# Patient Record
Sex: FEMALE | Race: WHITE
[De-identification: names, ages, dates, MRNs, and addresses within clinical notes are randomized per-mention and may not be internally consistent; named-entity substitution may affect disease eponyms.]

---

## 2018-12-27 ENCOUNTER — HOSPITAL ENCOUNTER (OUTPATIENT)
Dept: HOSPITAL 53 - M LAB REF | Age: 38
End: 2018-12-27
Attending: PHYSICIAN ASSISTANT
Payer: COMMERCIAL

## 2018-12-27 DIAGNOSIS — N39.0: Primary | ICD-10-CM

## 2018-12-27 LAB
AMORPH SED URNS QL MICRO: (no result)
APPEARANCE UR: (no result)
BACTERIA UR QL AUTO: NEGATIVE
BILIRUB UR QL STRIP.AUTO: NEGATIVE
GLUCOSE UR QL STRIP.AUTO: NEGATIVE MG/DL
HGB UR QL STRIP.AUTO: NEGATIVE
KETONES UR QL STRIP.AUTO: NEGATIVE MG/DL
LEUKOCYTE ESTERASE UR QL STRIP.AUTO: NEGATIVE
NITRITE UR QL STRIP.AUTO: NEGATIVE
PH UR STRIP.AUTO: 7 UNITS (ref 5–9)
PROT UR QL STRIP.AUTO: NEGATIVE MG/DL
RBC # UR AUTO: 1 /HPF (ref 0–3)
SP GR UR STRIP.AUTO: 1.02 (ref 1–1.03)
SQUAMOUS #/AREA URNS AUTO: 2 /HPF (ref 0–6)
UROBILINOGEN UR QL STRIP.AUTO: 0.2 MG/DL (ref 0–2)
WBC #/AREA URNS AUTO: 0 /HPF (ref 0–3)

## 2019-01-15 ENCOUNTER — HOSPITAL ENCOUNTER (OUTPATIENT)
Dept: HOSPITAL 53 - M LAB REF | Age: 39
End: 2019-01-15
Attending: OBSTETRICS & GYNECOLOGY
Payer: COMMERCIAL

## 2019-01-15 DIAGNOSIS — Z12.4: Primary | ICD-10-CM

## 2019-01-16 LAB — HPV LOW VOL RFLX: (no result)

## 2020-01-08 ENCOUNTER — HOSPITAL ENCOUNTER (OUTPATIENT)
Dept: HOSPITAL 53 - M RAD | Age: 40
End: 2020-01-08
Attending: ADVANCED PRACTICE MIDWIFE
Payer: COMMERCIAL

## 2020-01-08 DIAGNOSIS — N60.02: Primary | ICD-10-CM

## 2020-01-08 PROCEDURE — 76642 ULTRASOUND BREAST LIMITED: CPT

## 2020-01-08 PROCEDURE — 77066 DX MAMMO INCL CAD BI: CPT

## 2020-01-08 NOTE — REP
DIGITAL DIAGNOSTIC BILATERAL MAMMOGRAPHY WITH CAD, 3-D TOMOGRAPHY, AND FOCUSED

LEFT BREAST SONOGRAPHY:

 

HISTORY:  Left breast cyst.  The patient reports upper outer quadrant pain on the

left with thickening and tenderness.

 

Comparison is made with prior mammography of the right breast from [facility] dated this June 4, 2008 and January 28, 2009.

 

MAMMOGRAPHIC FINDINGS:  Breast parenchyma is heterogeneously dense in a pattern

which inhibits the sensitivity of mammography.  On routine mammographic views,

there is a needle biopsy marker clip in the right breast medially at the site

where prior mammography showed a nodule.  By history, this was benign.  No

dominant density is seen in either breast mammographically.  3D tomography shows

no suspicious finding.

 

SONOGRAPHIC FINDINGS:  The upper outer quadrant of the left breast was scanned.

Heterogeneous fibroglandular background echotexture is seen.  There are three

hypoechoic nodules seen.  In the 1-o'clock position, there is a 0.7 x 0.3 x 0.5

cm hypoechoic oval shaped nodule located 3.6 cm from the nipple.  At 2-o'clock

position, there is a 1.4 x 0.7 x 1.5 cm hypoechoic nodule located 4.5 cm from the

nipple.  At 2-o'clock position, there is also a 1.6 x 0.6 x 1.3 cm nodule located

3.1 cm from the nipple.  Each of these is oval in shape with its long axis

parallel to the skin.  They have a somewhat lobulated borders with enhanced

through transmission.  They are consistent with fibroadenomas although

nonspecific.  No cyst is seen.  No architectural distortion is noted.

 

IMPRESSION:

 

BIRADS 3:  BI-RADS/ACR category 3 mammogram.  Probably Benign Findings.

 

BIRADS category 3 probably benign breast imaging.  There are three solid

hypoechoic nodules in the upper outer quadrant of the left breast compatible with

fibroadenomas.  Followup sonography is recommended in 6 months.

 

Mammography is unremarkable and can be repeated in 1 year bilaterally.

 

This mammogram was interpreted with the aid of an FDA-approved computer-aided

detection system.

 

The patient states she had a clinical breast exam in January 2020.

 

The patient letter being requested is M3 dense.

 

This patient's estimated [facility] lifetime risk assessment for the breast

cancer is 12.6 %.

 

 

Electronically Signed by

Raphael Lipscomb MD 01/08/2020 01:31 P

## 2020-01-24 ENCOUNTER — HOSPITAL ENCOUNTER (OUTPATIENT)
Dept: HOSPITAL 53 - M IRPRO | Age: 40
End: 2020-01-24
Attending: SURGERY
Payer: COMMERCIAL

## 2020-01-24 VITALS — SYSTOLIC BLOOD PRESSURE: 114 MMHG | DIASTOLIC BLOOD PRESSURE: 68 MMHG

## 2020-01-24 DIAGNOSIS — N60.22: Primary | ICD-10-CM

## 2020-01-24 NOTE — REP
Left breast sonography:

 

History:  Left breast biopsy.

 

Findings:  Sonographic guidance is provided to Dr. Ng who performed

ultrasound-guided needle biopsy procedure.

 

 

Electronically Signed by

Raphael Lipscomb MD 01/24/2020 06:37 P

## 2020-01-24 NOTE — ROOPDOC
[facility] Report Of Operation


Report of Operation


DATE OF PROCEDURE: 1/24/20





PREPROCEDURE DIAGNOSES: left breast masses





POSTPROCEDURE DIAGNOSES: left breast masses





PROCEDURE: Ultrasound guided biopsy of one of three left breast masses and clip 

placement





SURGEON: Anatoliy Ng





ASSISTANT: 





ANESTHESIA:local, 6 cc





ESTIMATED BLOOD LOSS: Approximately 1 mL. 





COMPLICATIONS: none





REMARKS: clip in good position on post bx mammogram 





DESCRIPTION OF PROCEDURE: 





Lidocaine 1% 


Sodium Bicarbonate 8.4% 


Hydromark clip LOT [id number] Expiration 11/11/2022 REF  [id number]


Bx device: BARD [id number] x10 cm LOT [id number] Expiration 10/28/2022 REF [id number]







Informed consent was obtained in the preop area. The most common risk and 

possible complications including bleeding, hematoma, bruising, infection, injury

to surrounding structures were explained to the patient and she expressed 

understanding. 





Patient was taken to the procedure room and placed on the bed in the supine 

position with the left upper extremity placed above the head. Appropriate time 

out was done stating patients name, date of birth, and the procedure to be 

performed. The left breast was prepped and draped in the usual fashion. The 

ultrasound was used to confirm the location of the lesion in the left breast at 

2:00. There were two hypoechoic masses present at that location and one smaller 

hypoechoic mass at 1:00.  Decision before procedure was started to biopsy one of

the masses since all of them look similar and likely represent fibroadenomas.





Plain Lidocaine 1% and 8.4% sodium bicarbonate 10:1 mix was used to numb the 

skin, the biopsy site and tissues along the anticipated biopsy tract. Small skin

incision was made with blade number 11.  BARD Marquee 14G cannula with 

introducer ([id number]) was inserted through the incision and advanced under the 

ultrasound guidance to position immediately adjacent to the lesion. Next, the 

introducer was removed and BARD Marquee 14G biopsy device was places in the 

cannula. Pre-biopsy imaging, and post-biopsy imaging were captured. Five good 

core biopsies were taken at various levels of the lesion. 





Next, the biopsy device was withdrawn and a clip introducer was inserted into 

the biopsy site via the cannula. The Hydromark clip was deployed under direct 

vision. Post-clip placement image was captured. 





Manual pressure over the biopsy cavity and tract was held after the clip 

introducer was withdrawn. No bleeding was noted upon removal of the pressure. 





Post-biopsy mammogram of the left breast was obtained and showed clip in 

expected position. Postprocedural dressing was placed.


Patient tolerated procedure well and was taken to the recovery unit in stable 

condition.  Discharge instructions were discussed with the patient and she 

expressed understanding.





ANATOLIY Langston DO     Jan 24, 2020 11:34

## 2020-01-24 NOTE — REP
Digital diagnostic unilateral left breast mammography with CAD:  Two views.

 

History:  Marker clip placement views.  The patient is status post

ultrasound-guided needle biopsy procedure.

 

Findings:  Craniocaudad and mediolateral views of the left breast demonstrate the

HydroMARK clip in the upper outer quadrant posteriorly.  There is no evidence of

hematoma.

 

Impression:

 

Marker clip from ultrasound-guided biopsy noted in the upper outer quadrant left

breast.

 

 

Electronically Signed by

Raphael Lipscomb MD 01/24/2020 06:34 P

## 2021-01-27 ENCOUNTER — HOSPITAL ENCOUNTER (OUTPATIENT)
Dept: HOSPITAL 53 - M LAB REF | Age: 41
End: 2021-01-27
Attending: PHYSICIAN ASSISTANT
Payer: COMMERCIAL

## 2021-01-27 DIAGNOSIS — N39.0: Primary | ICD-10-CM

## 2021-01-27 LAB
APPEARANCE UR: CLEAR
BACTERIA UR QL AUTO: NEGATIVE
BILIRUB UR QL STRIP.AUTO: NEGATIVE
GLUCOSE UR QL STRIP.AUTO: NEGATIVE MG/DL
HGB UR QL STRIP.AUTO: NEGATIVE
KETONES UR QL STRIP.AUTO: NEGATIVE MG/DL
LEUKOCYTE ESTERASE UR QL STRIP.AUTO: NEGATIVE
NITRITE UR QL STRIP.AUTO: NEGATIVE
PH UR STRIP.AUTO: 8 UNITS (ref 5–9)
PROT UR QL STRIP.AUTO: NEGATIVE MG/DL
RBC # UR AUTO: 0 /HPF (ref 0–3)
SP GR UR STRIP.AUTO: 1.02 (ref 1–1.03)
SQUAMOUS #/AREA URNS AUTO: 0 /HPF (ref 0–6)
UROBILINOGEN UR QL STRIP.AUTO: 0.2 MG/DL (ref 0–2)
WBC #/AREA URNS AUTO: 1 /HPF (ref 0–3)

## 2021-02-05 ENCOUNTER — HOSPITAL ENCOUNTER (OUTPATIENT)
Dept: HOSPITAL 53 - M LAB REF | Age: 41
End: 2021-02-05
Attending: PHYSICIAN ASSISTANT
Payer: COMMERCIAL

## 2021-02-05 ENCOUNTER — HOSPITAL ENCOUNTER (OUTPATIENT)
Dept: HOSPITAL 53 - M RAD | Age: 41
End: 2021-02-05
Attending: PHYSICIAN ASSISTANT
Payer: COMMERCIAL

## 2021-02-05 DIAGNOSIS — Z97.5: ICD-10-CM

## 2021-02-05 DIAGNOSIS — N39.0: Primary | ICD-10-CM

## 2021-02-05 DIAGNOSIS — Z87.442: Primary | ICD-10-CM

## 2021-02-05 LAB
APPEARANCE UR: CLEAR
BACTERIA UR QL AUTO: (no result)
BILIRUB UR QL STRIP.AUTO: NEGATIVE
GLUCOSE UR QL STRIP.AUTO: NEGATIVE MG/DL
HGB UR QL STRIP.AUTO: (no result)
KETONES UR QL STRIP.AUTO: (no result) MG/DL
LEUKOCYTE ESTERASE UR QL STRIP.AUTO: NEGATIVE
NITRITE UR QL STRIP.AUTO: POSITIVE
PH UR STRIP.AUTO: 6 UNITS (ref 5–9)
PROT UR QL STRIP.AUTO: NEGATIVE MG/DL
RBC # UR AUTO: 9 /HPF (ref 0–3)
SP GR UR STRIP.AUTO: 1.02 (ref 1–1.03)
SQUAMOUS #/AREA URNS AUTO: 0 /HPF (ref 0–6)
UROBILINOGEN UR QL STRIP.AUTO: 4 MG/DL (ref 0–2)
WBC #/AREA URNS AUTO: 1 /HPF (ref 0–3)

## 2021-02-05 NOTE — REP
INDICATION:

KIDNEY STONES.



COMPARISON:

None.



TECHNIQUE:

Two AP views abdomen and pelvis.



FINDINGS:

There is mild diffuse fecal material throughout the colon.  No dilated bowel loops are

seen.  No abnormal calcifications are seen.  No definite renal stones are seen.  IUD

is seen in the pelvis centrally.  The visualized osseous structures are unremarkable.



IMPRESSION:

Unremarkable KUB.  No radiographic evidence of renal stones.





<Electronically signed by Desmond Salazar > 02/05/21 2006

## 2021-02-16 ENCOUNTER — HOSPITAL ENCOUNTER (EMERGENCY)
Dept: HOSPITAL 53 - M ED | Age: 41
Discharge: HOME | End: 2021-02-16
Payer: COMMERCIAL

## 2021-02-16 VITALS — BODY MASS INDEX: 24.43 KG/M2 | WEIGHT: 155.65 LBS | HEIGHT: 67 IN

## 2021-02-16 VITALS — SYSTOLIC BLOOD PRESSURE: 120 MMHG | DIASTOLIC BLOOD PRESSURE: 70 MMHG

## 2021-02-16 DIAGNOSIS — E80.6: ICD-10-CM

## 2021-02-16 DIAGNOSIS — G43.909: ICD-10-CM

## 2021-02-16 DIAGNOSIS — R39.15: ICD-10-CM

## 2021-02-16 DIAGNOSIS — Z88.5: ICD-10-CM

## 2021-02-16 DIAGNOSIS — N20.0: Primary | ICD-10-CM

## 2021-02-16 DIAGNOSIS — Z97.5: ICD-10-CM

## 2021-02-16 DIAGNOSIS — R10.2: ICD-10-CM

## 2021-02-16 DIAGNOSIS — Z87.59: ICD-10-CM

## 2021-02-16 LAB
ALBUMIN SERPL BCG-MCNC: 3.9 GM/DL (ref 3.2–5.2)
ALT SERPL W P-5'-P-CCNC: 18 U/L (ref 12–78)
BASOPHILS # BLD AUTO: 0 10^3/UL (ref 0–0.2)
BASOPHILS NFR BLD AUTO: 0.8 % (ref 0–1)
BILIRUB CONJ SERPL-MCNC: 0.3 MG/DL (ref 0–0.2)
BILIRUB SERPL-MCNC: 1.7 MG/DL (ref 0.2–1)
CHLAMYDIA DNA AMPLIFICATION: NEGATIVE
EOSINOPHIL # BLD AUTO: 0 10^3/UL (ref 0–0.5)
EOSINOPHIL NFR BLD AUTO: 0.8 % (ref 0–3)
HCT VFR BLD AUTO: 40.2 % (ref 36–47)
HGB BLD-MCNC: 13.2 G/DL (ref 12–15.5)
LIPASE SERPL-CCNC: 103 U/L (ref 73–393)
LYMPHOCYTES # BLD AUTO: 1.6 10^3/UL (ref 1.5–5)
LYMPHOCYTES NFR BLD AUTO: 30.8 % (ref 24–44)
MCH RBC QN AUTO: 30.5 PG (ref 27–33)
MCHC RBC AUTO-ENTMCNC: 32.8 G/DL (ref 32–36.5)
MCV RBC AUTO: 92.8 FL (ref 80–96)
MONOCYTES # BLD AUTO: 0.4 10^3/UL (ref 0–0.8)
MONOCYTES NFR BLD AUTO: 7.6 % (ref 2–8)
N GONORRHOEA RRNA SPEC QL NAA+PROBE: NEGATIVE
NEUTROPHILS # BLD AUTO: 3.1 10^3/UL (ref 1.5–8.5)
NEUTROPHILS NFR BLD AUTO: 59.8 % (ref 36–66)
PLATELET # BLD AUTO: 184 10^3/UL (ref 150–450)
PROT SERPL-MCNC: 6.6 GM/DL (ref 6.4–8.2)
RBC # BLD AUTO: 4.33 10^6/UL (ref 4–5.4)
WBC # BLD AUTO: 5.1 10^3/UL (ref 4–10)

## 2021-02-16 PROCEDURE — 87661 TRICHOMONAS VAGINALIS AMPLIF: CPT

## 2021-02-16 PROCEDURE — 84702 CHORIONIC GONADOTROPIN TEST: CPT

## 2021-02-16 PROCEDURE — 81001 URINALYSIS AUTO W/SCOPE: CPT

## 2021-02-16 PROCEDURE — 99284 EMERGENCY DEPT VISIT MOD MDM: CPT

## 2021-02-16 PROCEDURE — 83690 ASSAY OF LIPASE: CPT

## 2021-02-16 PROCEDURE — 80047 BASIC METABLC PNL IONIZED CA: CPT

## 2021-02-16 PROCEDURE — 85025 COMPLETE CBC W/AUTO DIFF WBC: CPT

## 2021-02-16 PROCEDURE — 96360 HYDRATION IV INFUSION INIT: CPT

## 2021-02-16 PROCEDURE — 80076 HEPATIC FUNCTION PANEL: CPT

## 2021-02-16 PROCEDURE — 74177 CT ABD & PELVIS W/CONTRAST: CPT

## 2021-02-16 NOTE — CCD
Summarization Of Episode

                             Created on: 2021



CATINA MORIN

External Reference #: [id number]

: 1980

Sex: Female



Demographics





                          Address                   [address] [place] [address]

 

                          Home Phone                [phone]

 

                          Preferred Language        English

 

                          Marital Status            

 

                          Episcopal Affiliation     NONE

 

                          Race                      White

 

                          Ethnic Group              Not  or 





Author





                          Author                    [facility]

 

                          Organization              [facility]

 

                          Address                   Unknown

 

                          Phone                     Unavailable







Support





                Name            Relationship    Address         Phone

 

                MANNIE MORIN    Next Of Kin     Unknown         Unavailable

 

                    [employer] Next Of Kin         [address] [place]  [address]                    [phone]

 

                    BROOKS MORIN         Next Of Kin         [address] [place] [address]                       [phone]

 

                    [employer]  [place]  [address]                    [phone]

 

                    DONNA MORIN     Next Of Kin         [address] [place], [place] [address]                       [phone]

 

                    Brooks Morin         [place] [id number]                       [phone]







Care Team Providers





                    Care Team Member Name Role                Phone

 

                    Claudia,  Sierra FNP Unavailable         Unavailable

 

                    Claudia,  Sierra FNP Unavailable         Unavailable

 

                    Claudia,  Sierra FNP Unavailable         Unavailable

 

                    Claudia,  Sierra FNP Unavailable         Unavailable

 

                    Claudia,  Sierra FNP Unavailable         Unavailable

 

                    Claudia,  Sierra FNP Unavailable         Unavailable

 

                    Claudia,  Sierra FNP Unavailable         Unavailable

 

                    Claudia,  Sierra FNP Unavailable         Unavailable

 

                    Claudia,  Sierra FNP Unavailable         Unavailable

 

                    Claudia,  Sierra FNP Unavailable         Unavailable

 

                    Claudia,  Sierra FNP Unavailable         Unavailable

 

                    Claudia,  Sierra FNP Unavailable         Unavailable

 

                    Claudia,  Sierra FNP Unavailable         Unavailable

 

                    Claudia,  Sierra FNP Unavailable         Unavailable

 

                    Claudia,  Sierra FNP Unavailable         Unavailable

 

                    Claudia,  Sierra FNP Unavailable         Unavailable

 

                    Claudia,  Sierra FNP Unavailable         Unavailable

 

                    Claudia,  Sierra FNP Unavailable         Unavailable

 

                    Claudia,  Sierra FNP Unavailable         Unavailable

 

                    Claudia,  Sierra FNP Unavailable         Unavailable

 

                    Claudia,  Sierra FNP Unavailable         Unavailable

 

                    Claudia,  Sierra FNP Unavailable         Unavailable

 

                    Claudia,  Sierra FNP Unavailable         Unavailable

 

                    Claudia,  Sierra FNP Unavailable         Unavailable

 

                    Claudia,  Sierra FNP Unavailable         Unavailable

 

                    Claudia,  Sierra FNP Unavailable         Unavailable

 

                    Claudia,  Sierra FNP Unavailable         Unavailable

 

                    PIETER, J Kaley ANP Unavailable         Unavailable

 

                    PIETER, J Kaley ANP Unavailable         Unavailable

 

                    PIETER, J Kaley ANP Unavailable         Unavailable

 

                    PIETER, J Kaley ANP Unavailable         Unavailable

 

                    PIETER, J Kaley ANP Unavailable         Unavailable

 

                    PIETER, J Kaley ANP Unavailable         Unavailable

 

                    PIETER, J Kaley ANP Unavailable         Unavailable

 

                    PIETER, J Kaley ANP Unavailable         Unavailable

 

                    PIETER, J Kaley ANP Unavailable         Unavailable

 

                    PIETER, J Kaley ANP Unavailable         Unavailable

 

                    PIETER, J Kaley ANP Unavailable         Unavailable

 

                    PIETER, J Kaley ANP Unavailable         Unavailable

 

                    PIETER, J Kaley ANP Unavailable         Unavailable

 

                    PIETER, J Kaley ANP Unavailable         Unavailable

 

                    PIETER, J Kaley ANP Unavailable         Unavailable

 

                    PIETER, J Kaley ANP Unavailable         Unavailable

 

                    PIETER, J Kaley ANP Unavailable         Unavailable

 

                    PIETER, J Kaley ANP Unavailable         Unavailable

 

                    PIETER, J Kaley ANP Unavailable         Unavailable

 

                    PIETER, J Kaley ANP Unavailable         Unavailable

 

                    PIETER, J Kaley ANP Unavailable         Unavailable

 

                    PIETER, J Kaley ANP Unavailable         Unavailable

 

                    PIETER, J Kaley ANP Unavailable         Unavailable

 

                    PIETER, J Kaley ANP Unavailable         Unavailable

 

                    PIETER, J Kaley ANP Unavailable         Unavailable

 

                    PIETER, J Kaley ANP Unavailable         Unavailable

 

                    PIETER, J Kaley ANP Unavailable         Unavailable

 

                    PIETER, J Kaley ANP Unavailable         Unavailable

 

                    PIETER, J Kaley ANP Unavailable         Unavailable

 

                    PIETER, J Kaley ANP Unavailable         Unavailable

 

                    PIETER, J Kaley ANP Unavailable         Unavailable

 

                    PIETER, J Kaley ANP Unavailable         Unavailable

 

                    PIETER, J Kaley ANP Unavailable         Unavailable

 

                    PIETER, J Kaley ANP Unavailable         Unavailable

 

                    PIETER, J Kaley ANP Unavailable         Unavailable

 

                    PIETER, J Kaley ANP Unavailable         Unavailable

 

                    PIETER, J Kaley ANP Unavailable         Unavailable

 

                    PIETER, J Kaley ANP Unavailable         Unavailable

 

                    PIETER, J Kaley ANP Unavailable         Unavailable

 

                    PIETER, J Kaley ANP Unavailable         Unavailable

 

                    PIETER, J Kaley ANP Unavailable         Unavailable

 

                    PIETER, J Kaley ANP Unavailable         Unavailable

 

                    PIETER, J Kaley ANP Unavailable         Unavailable

 

                    PIETER, J Kaley ANP Unavailable         Unavailable

 

                    PIETER, J Kaley ANP Unavailable         Unavailable

 

                    PIETER, J Kaley ANP Unavailable         Unavailable

 

                    PIETER, J Kaley ANP Unavailable         Unavailable

 

                    PIETER, J Kaley ANP Unavailable         Unavailable

 

                    PIETER, J Kaley ANP Unavailable         Unavailable

 

                    PIETER, J Kaley ANP Unavailable         Unavailable

 

                    PIETER, J Kaley ANP Unavailable         Unavailable

 

                    PIETER, J Kaley ANP Unavailable         Unavailable

 

                    PIETER, J Kaley ANP Unavailable         Unavailable

 

                    PIETER, J Kaley ANP Unavailable         Unavailable

 

                    PIETER, J Kaley ANP Unavailable         Unavailable

 

                    PIETER, J Kaley ANP Unavailable         Unavailable

 

                    PIETER, J Kaley ANP Unavailable         Unavailable

 

                    PIETER, J Kaley ANP Unavailable         Unavailable

 

                    PIETER, J Kaley ANP Unavailable         Unavailable

 

                    PIETER, J Kaley ANP Unavailable         Unavailable

 

                    PIETER, J Kaley ANP Unavailable         Unavailable

 

                    PIETER, J Kaley ANP Unavailable         Unavailable

 

                    PIETER, J Kaley ANP Unavailable         Unavailable

 

                    PIETER, J Kaley ANP Unavailable         Unavailable

 

                    PIETER, J Kaley ANP Unavailable         Unavailable

 

                    PIETER, J Kaley ANP Unavailable         Unavailable



                                  



Re-disclosure Warning

          The records that you are about to access may contain information from 
federally-assisted alcohol or drug abuse programs. If such information is 
present, then the following federally mandated warning applies: This information
has been disclosed to you from records protected by federal confidentiality 
rules (42 CFR part 2). The federal rules prohibit you from making any further 
disclosure of this information unless further disclosure is expressly permitted 
by the written consent of the person to whom it pertains or as otherwise 
permitted by 42 CFR part 2. A general authorization for the release of medical 
or other information is NOT sufficient for this purpose. The Federal rules 
restrict any use of the information to criminally investigate or prosecute any 
alcohol or drug abuse patient.The records that you are about to access may 
contain highly sensitive health information, the redisclosure of which is 
protected by Article 27-F of the [facility] Public Health law. If you 
continue you may have access to information: Regarding HIV / AIDS; Provided by 
facilities licensed or operated by the [facility] Office of Mental Health; 
or Provided by the [facility] Office for People With Developmental 
Disabilities. If such information is present, then the following [place] 
mandated warning applies: This information has been disclosed to you from 
confidential records which are protected by state law. State law prohibits you 
from making any further disclosure of this information without the specific 
written consent of the person to whom it pertains, or as otherwise permitted by 
law. Any unauthorized further disclosure in violation of state law may result in
a fine or custodial sentence or both. A general authorization for the release of 
medical or other information is NOT sufficient authorization for further disc
losure.                                                                         
    



Allergies and Adverse Reactions

          



           Type       Description Substance  Reaction   Status     Data Source(s

)

 

                Morphine        Morphine        Morphine Sulfate 15 MG Extended 

Release Oral Tablet Felt 

Faint, sweaty             Active                    eCW1 (Formerly [facility], 1928–1983)

 

                Morphine        Morphine        Morphine Sulfate 15 MG Extended 

Release Oral Tablet Felt 

Faint, sweaty             Active                    eCW1 (Formerly [facility], 1928–1983)

 

                Morphine        Morphine        Morphine Sulfate 15 MG Extended 

Release Oral Tablet Felt 

Faint, sweaty             Active                    eCW1 (Formerly [facility], 1928–1983)



                                                                                
                           



Family History

          



             Family Member Name Family Member Gender Family Member Status Date o

f Status 

Description                             Data Source(s)

 

           Unknown    Unknown    Problem                          [facility] ([facility], )

 

           Unknown    Unknown    Problem                          [facility] (Jordy Coleman MD )



                                                                                
                 



Encounters

          



           Encounter  Providers  Location   Date       Indications Data Source(s

)

 

                Unknown                         [address] [place] [id number] 2021 12:00:00 AM 

EST                                                 eCW1 (Formerly [facility], 1928–1983)

 

                Outpatient      Attender: Sierra Deluna 07

/15/2020 08:00:00 AM 

EDT                                                 [facility] ([facility]

)

 

                [facility] [address] [phone] 2020 

12:00:00 AM EST                                     eCW1 (Formerly [facility], 1928–1983)

 

                [facility]                 [address] [place] [phone] 2020 

12:00:00 AM EST                                     eCW1 (Formerly [facility], 1928–1983)

 

           Outpatient Referrer: Kaley CASTILLO            2020 12:13:00 

PM EST            [facility]

 

                [address] [phone] 2020 

12:00:00 AM EST                                     eCW1 (Formerly [facility], 1928–1983)

 

           Outpatient Referrer: Kaley CASTILLO            2020 08:34:00 

PM EST            [facility]

 

                [facility] [address] [phone] 01/15/2020 

12:00:00 AM EST                                     eCW1 (Former[facility], [facility])

 

                [facility] [address] [phone] 01/10/2020 

12:00:00 AM EST                                     eCW1 (Formerly [facility], [facility])

 

                [address] [phone] 2020 

12:00:00 AM EST                                     eCW1 (Formerly [facility], 1928–1983)



                                                                                
                                                                                
                



Medications

          



          Medication Brand Name Start Date Product Form Dose      Route     Admi

nistrative 

Instructions Pharmacy Instructions Status     Indications Reaction   Description

 Data 

Source(s)

 

           Cephalexin 500 MG Oral Capsule CEPHALEXIN 2021 12:00:00 AM EST 

capsule    21         

                          TAKE ONE CAPSULE BY MOUTH THREE TIMES A DAY FOR 7 DAYS

 TAKE ONE CAPSULE BY 

MOUTH THREE TIMES A DAY FOR 7 DAYS SOLD: 2021                             

           [facility]

 

          100 mg              2021 12:00:00 AM EST capsule   14           

       TAKE ONE CAPSULE BY MOUTH TWICE

A DAY FOR 7 DAYS          TAKE ONE CAPSULE BY MOUTH TWICE A DAY FOR 7 DAYS SOLD:

 

2021                                                      [facility]

 

          150 mg              2021 12:00:00 AM EST tablet    2            

       TAKE ONE TABLET BY MOUTH ONCE A 

DAY THEN REPEAT IN 48 HOURS             TAKE ONE TABLET BY MOUTH ONCE A DAY THEN

 REPEAT IN 

48 HOURS     SOLD: 2021                                        [facility]

s

 

          200 mg              2021 12:00:00 AM EST tablet    6            

       TAKE ONE TABLET BY MOUTH THREE 

TIMES A DAY FOR 2 DAYS    TAKE ONE TABLET BY MOUTH THREE TIMES A DAY FOR 2 DAYS 

SOLD: 2021                                                 [facility]

 

          500 mg              2021 12:00:00 AM EST tablet    10           

       TAKE ONE TABLET BY MOUTH EVERY 

12 HOURS FOR 5 DAYS       TAKE ONE TABLET BY MOUTH EVERY 12 HOURS FOR 5 DAYS SOL

D: 

2021                                                      [facility]

 

          150 mg              2021 12:00:00 AM EST tablet    2            

       TAKE 1 TABLET BY MOUTH NOW, THEN 

TAKE 2ND TABLET IN 48 HOURS             TAKE 1 TABLET BY MOUTH NOW, THEN TAKE 2N

D TABLET IN 

48 HOURS     SOLD: 2021                                        [facility]

s

 

          200 mg              2021 12:00:00 AM EST tablet    6            

       TAKE ONE TABLET BY MOUTH THREE 

TIMES A DAY FOR 2 DAYS    TAKE ONE TABLET BY MOUTH THREE TIMES A DAY FOR 2 DAYS 

SOLD: 2021                                                 [facility]

 

          875-125 mg           10/01/2020 12:00:00 AM EDT tablet    20          

        TAKE ONE TABLET BY MOUTH 

TWICE A DAY FOR 10 DAYS   TAKE ONE TABLET BY MOUTH TWICE A DAY FOR 10 DAYS SOLD:

 

10/01/2020                                                      [facility]

 

          150 mg              10/01/2020 12:00:00 AM EDT tablet    2            

       TAKE 1 TABLET BY MOUTH THEN TAKE 

2ND TABLET IN 48 HOURS    TAKE 1 TABLET BY MOUTH THEN TAKE 2ND TABLET IN 48 HOUR

S 

SOLD: 10/01/2020                                                 [facility]



                                                                                
                                                                   



Insurance Providers

          



             Payer name   Policy type / Coverage type Policy ID    Covered party

 ID Covered 

party's relationship to chairez Policy Chairez             Plan Information

 

          [employer]           [id number]           SP                  80

[id number]

 

          [employer]           [id number]           SP                  80

[id number]

 

          [employer] O         [id number]           S                   80

[id number]

 

          [employer] Commercial [id number]                               [id number]

[id number]

 

          [employer] [id number]           Self                [id number]

[id number]

 

          Ghi       Medigap Part B [id number]           Family Dependent         

  [id number]

 

          MVP       Health Maintenance Organization (HMO) [id number]           

Self                [id number]

 

          Ghi       Medigap Part B [id number]           Family Dependent         

  [id number]

 

          MVP       Health Maintenance Organization (HMO) [id number]           

Self                [id number]

 

          Ghi       Medigap Part B [id number]           Family Dependent         

  [id number]

 

          P       Health Maintenance Organization (HMO) [id number]           

Self                [id number]

 

          Ghi       Medigap Part B                     Family Dependent         

   

 

          P       Health Maintenance Organization (HMO)                     Se

lf                 

 

          MVP       H         [id number]           Self                [id number]

[id number]

 

          P HEALTH CARE HEA       [id number]                               80

[id number]

 

          MEDICARE  SAROJ       UNAVAILABLE                               UNAVAILA

BLE

 

          P HEALTH CARE           [id number]           SP                  80

[id number]

 

          P Health Care Commercial                     Self                 

 

          MVP EXCHANGE U         [id number]           Self                [id number]

[id number]

 

          [facility] Healthcare Commercial                                          

 

          P Healthcare Commercial                     Self                 

 

          [facility] PHY           [id number]           SP                  

[id number]

 

          [facility] PHY           [id number]           SP                  

[id number]

 

          SELF PAY            UNAVAILABLE           SP                  UNAVAILA

BLE

 

                              [id number]                               [id number]

[id number]



                                                                                
                                                                                
                                                                                
                                                                                
    



Problems, Conditions, and Diagnoses

          



           Code       Display Name Description Problem Type Effective Dates Data

 Source(s)

 

           D68.51     [id number]  Factor V Leiden Problem    2020 12:00:00 

AM EST eCW1 

(Replaced by [facility])

 

             Z86.711      [id number]    History of pulmonary embolism Problem    

  2020 12:00:00 AM 

EST                                     eCW1 (Replaced by [facility])

 

           D68.51     [id number]  Factor V Leiden Problem    2020 12:00:00 

AM EST eCW1 

(Replaced by [facility])

 

             Z86.711      [id number]    History of pulmonary embolism Problem    

  2020 12:00:00 AM 

EST                                     eCW1 (Replaced by [facility])



                                                                                
                                               



Surgeries/Procedures

          



             Procedure    Description  Date         Indications  Data Source(s)

 

             NO CHARGE VISIT              2020 12:00:00 AM EST            

  eCW1 (Replaced by [facility])



                                                                                
       



Results

          



                    ID                  Date                Data Source

 

                    [id number]          07/15/2020 08:14:00 AM EDT [facility] ([facility])









          Name      Value     Range     Interpretation Code Description Data Beth

rce(s) Supporting 

Document(s)

 

           Cholesterol [Mass/volume] in Serum or Plasma 158 mg/dL  131-200      

                    [facility] 

([facility])                   

 

           Triglyceride [Mass/volume] in Serum or Plasma 39 mg/dL         

                     [facility] 

([facility])                   

 

           Cholesterol in HDL [Mass/volume] in Serum or Plasma 55 mg/dL   35-60 

                           [facility] 

([facility])                   

 

                    Cholesterol in LDL [Mass/volume] in Serum or Plasma by calcu

lation 95 CALC             

                                          [facility] ([facility])  









                    ID                  Date                Data Source

 

                    [id number]          07/15/2020 08:14:00 AM EDT [facility] ([facility])









          Name      Value     Range     Interpretation Code Description Data Beth

rce(s) Supporting 

Document(s)

 

           Glucose [Mass/volume] in Serum or Plasma 79 mg/dL   74-99            

                [facility] ([facility])                              

 

                                        100-125 mg/dL     PRE-DIABETES/FASTING

>126 mg/dL          DIABETES/FASTING

 

 

           Urea nitrogen [Mass/volume] in Serum or Plasma 15 mg/dL   7-18       

                      [facility] 

([facility])                   

 

           Sodium [Moles/volume] in Serum or Plasma 140 meq/L  136-145          

                [facility] ([facility])                             

 

          Creatinine 0.9 mg/dL 0.6-1.3                       [facility]  

 

           Chloride [Moles/volume] in Serum or Plasma 105 meq/L           

                  [facility] 

([facility])                   

 

           Potassium [Moles/volume] in Serum or Plasma 4.2 meq/L  3.5-5.1       

                   [facility] 

([facility])                   

 

           Carbon dioxide, total [Moles/volume] in Serum or Plasma 28 meq/L   21

-32                            

[facility] ([facility])            

 

                    Alkaline phosphatase isoenzyme [Units/volume] in Serum or Pl

asma 38 mg/dL            

                                                [facility] ([facility])  

 

           Calcium [Mass/volume] in Serum or Plasma 8.3 mg/dL  8.5-10.1         

                [facility] 

([facility])                   

 

                                        NOTE:

RESULT VERIFIED.





 

 

          Total Bilirubin 1.9 mg/dL 0.2-1.0                       [facility] ([facility])  

 

                          Aspartate aminotransferase [Enzymatic activity/volume]

 in Serum or Plasma 13 U/L

             15-37                                  [facility] ([facility]

)  

 

                    Alanine aminotransferase [Enzymatic activity/volume] in Seru

m or Plasma 24 U/L              

12-78                                           [facility] ([facility])  

 

           Albumin [Mass/volume] in Serum or Plasma 3.9 g/dL   3.4-5.0          

                [facility] ([facility]                              

 

          A/G Ratio 1.39 CALC 1.00-1.90                     [facility]  

 

           Proteinase 3 Ab [Units/volume] in Serum 6.7 g/dL   6.4-8.2           

               [facility] ([facility]                              

 

                                        Glomerular filtration rate/1.73 sq M pre

dicted among non-blacks [Volume 

Rate/Area] in Serum or Plasma by Creatinine-based formula (MDRD) Laboratory test

result                                              [facility]

)  

 

                                        Glomerular filtration rate/1.73 sq M pre

dicted among blacks [Volume Rate/Area] 

in Serum or Plasma by Creatinine-based formula (MDRD) Laboratory test result    

                  

                                        [facility]  

 

                                        <content>CHRONIC KIDNEY DISEASE STAGING 

PER 

NKF</content><br/><content></content><br/><content>STAGE I & II      GFR >= 60  
     NORMAL TO MILDLY DECREASED</content><br/><content>STAGE III          GFR 
30-59          MODERATELY DECREASED</content><br/><content>STAGE IV           
GFR 15-29         SEVERELY DECREASED</content><br/><content>STAGE V            
GFR <15            VERY LITTLE GFR LEFT</content><br/><content>ESRD             
   GFR <15            ON RRT</content><br/><content></content> 









                    ID                  Date                Data Source

 

                    [id number]          07/15/2020 08:14:00 AM EDT [facility] ([facility])









          Name      Value     Range     Interpretation Code Description Data Beth

rce(s) Supporting 

Document(s)

 

           Erythrocytes [#/volume] in Blood by Automated count 4.52 x10*6/UL 4.2

0-6.30                        

[facility] ([facility])            

 

           Leukocytes [#/volume] in Blood by Automated count 3.5 x10*3/UL 4.1-10

.9                         

[facility] ([facility])            

 

           Hematocrit [Volume Fraction] of Blood by Automated count 39.8 %     3

7.0-51.0                        

[facility] ([facility])            

 

           Hemoglobin [Mass/volume] in Blood 13.9 g/dL  12.0-18.0               

         [facility] ([facility]                              

 

          MCV       87.9 fL   80.0-97.0                     [facility]  

 

          MCH       30.7 pg   26.0-32.0                     [facility]  

 

          MCHC      34.9 g/dL 31.0-38.0                     [facility]  

 

           Erythrocyte distribution width [Ratio] by Automated count 12.4 %     

11.6-13.7                        

[facility] ([facility])            

 

           Platelets [#/volume] in Blood by Automated count 219 x10*3/-440

                          [facility]

 ([facility])                  

 

          MPV       8.9 FL    7.8-11.0                      [facility]  

 

          Lymph %   42.6 %    10.0-58.5                     [facility]  

 

          Mid %     8.4 %     1.7-9.3                       [facility]  

 

          Neut %    49.0 %    37.0-92.0                     [facility]  

 

          Lymph #   1.5 x10*3/UL 0.6-4.1                       [facility] ([facility])  

 

          Mid #     0.3 x10*3/UL 0.1-0.6                       [facility] ([facility])  

 

          Neut #    1.7 x10*3/UL 2.0-7.8                       [facility] ([facility])  







                                        Procedure

 

                                          



                                                                                
                                      



Social History

          



           Code       Duration   Value      Status     Description Data Source(s

)

 

           Smoking    2021 12:00:00 AM EST Never Smoker completed  Never S

moker eCW1 

(Replaced by [facility])



                                                                                
                  



Vital Signs

          



                    ID                  Date                Data Source

 

                    UNK                                      









           Name       Value      Range      Interpretation Code Description Data

 Source(s)

 

           Body mass index (BMI) [Ratio] 23.4 kg/m2                       23.4 k

g/m2 [facility] ([facility]

 

           Oxygen saturation in Arterial blood by Pulse oximetry 99 %           

                  99 %       [facility] 

([facility])

 

           Body weight 146.00 [lb_av]                       146.00 [lb_av] [facility]

T ([facility])

 

           Body height 66.25 [in_i]                       66.25 [facility]in_i] [facility]

 

                                        5'6.25" 

 

           Heart rate 78 /min                          78 /min    [facility] ([facility])

 

           Diastolic blood pressure 74 mm[Hg]                        74 mm[Hg]  

[facility] ([facility])

 

           Systolic blood pressure 132 mm[Hg]                       132 mm[Hg] M

[facility] ([facility])

 

           Diastolic blood pressure 66 mm[Hg]                        66 mm[Hg]  

eCW1 (Replaced by [facility])

 

           Systolic blood pressure 100 mm[Hg]                       100 mm[Hg] e

CW1 (Replaced by [facility])

 

           Body mass index (BMI) [Ratio] 23.63 kg/m2                       23.63

 kg/m2 [facility] (Replaced by [facility])

 

           Body height 66 [in_us]                       66 [in_us] W1 ([facility])

 

           Body weight Measured 146.4 [lb_av]                       146.4 [lb_av

] W1 (Replaced by [facility])

 

           Diastolic blood pressure 78 mm[Hg]                        78 mm[Hg]  

eCW1 (Replaced by [facility])

 

           Systolic blood pressure 130 mm[Hg]                       130 mm[Hg] e

CW1 (Replaced by [facility])

 

           Body temperature 97.8 [degF]                       97.8 [degF] [facility] (

Replaced by [facility])

 

           Respiratory rate 16 /min                          16 /min    eCW1 ([facility])

 

           Heart rate 68 /min                          68 /min    eCW1 ([facility])

 

           Body mass index (BMI) [Ratio] 23.72 kg/m2                       23.72

 kg/m2 eCW1 (Replaced by [facility])

 

           Body height                                   [in_us]   eCW1 ([facility])

 

           Body weight Measured 147 [lb_av]                       147 [lb_av] eC

W1 (Replaced by [facility])

 

           Diastolic blood pressure 70 mm[Hg]                        70 mm[Hg]  

eCW1 (Replaced by [facility])

 

           Systolic blood pressure 122 mm[Hg]                       122 mm[Hg] e

CW1 (Replaced by [facility])

 

           Body mass index (BMI) [Ratio] 23.56 kg/m2                       23.56

 kg/m2 eCW1 (Replaced by [facility])

 

           Body height                                   [in_us]   eCW1 ([facility])

 

           Body weight Measured 146 [lb_av]                       146 [lb_av] eC

W1 (Replaced by [facility])

## 2021-02-16 NOTE — REP
INDICATION:

pelvic pain/pressure, urinary urgency, FHx uterine CA.



COMPARISON:

Comparison study June 7, 2015..



TECHNIQUE:

Helical scanning was acquired and 4 mm axial images are re-formatted.  Coronal and

sagittal MPR images were generated and reviewed.  The contrast enhancement dose is 100

mL of intravenous Isovue 370.



FINDINGS:

Preliminary digital  radiograph demonstrates an unremarkable bowel gas pattern.

An IUD is seen in the pelvis.



On axial CT images, the lung bases are clear.  There is no evidence of pleural

effusion or upper abdominal ascites.  The liver and the spleen are normal in size and

homogeneous in texture.  No focal hepatic lesion is seen.  No abnormality is noted in

the gallbladder.  Normal adrenal glands are seen bilaterally.  Pancreas is unchanged

in appearance or configuration from the 16 March 2018 prior CT images.  Kidneys

enhance symmetrically.  There is a 2 mm intrarenal calculus in the lower pole of the

left kidney.  There is another 3 mm calculus in the upper pole the right kidney.  No

hydronephrosis is seen.  No other intrarenal calculus is observed.  No bladder

calculus or ureteral calculus is observed.  The uterus is somewhat enlarged and is

retroverted retroflexed.  It measures 10.0 cm in length by 5.8 by 7.5 cm in transverse

dimension.  The IUD is seen in what appears to be good position in the uterus.  No

ovarian abnormality is appreciated.  There is no evidence of distention of either

gonadal vein.  Small and large bowel loops are normal in the abdomen and pelvis.

There is no evidence of obstruction.  No pelvic mass or adenopathy is seen.  A normal

appendix is seen medial to the cecum over the iliac vessels.  There is no CT evidence

of appendicitis.  No abdominal wall defect is seen.  No bony destructive lesion is

appreciated.



IMPRESSION:

Bilateral intrarenal nephrolithiasis without evidence of hydronephrosis.  No

hydronephrosis or other urinary tract calculus is seen.  Retroverted retroflexed

uterus containing an IUD in good position.





<Electronically signed by Tanvir Lipscomb > 02/16/21 0502

## 2021-02-16 NOTE — CCD
Occupational Therapy  Screen     Patient Name:  Anabella Aranda   MRN:  [id number]    Patient not seen today secondary to increased pain at this time. Pt's son requesting evaluation be held until tomorrow's date. Son reports pt has not been repositioned since he has been in pt's room since 10 AM.     Pt repositioned on wedge and in L side lying; knees locked into extension on bed 2/2 pt recovering from recent knee surgery. Re-educated on impt of knee extension and proper elevation (no propping under knee).     Pt lives with son in [facility], no steps to enter, t/s combo. Son reports that he provides 24/7 supervision for pt. Therapy has not been into home for ~ 2 weeks- since wound dehiscence. States pt requires assist with ADLs and functional mobility since surgery. Owns TTB, RW, BSC, and SPC.     Encouraged pt to perform UE therex while supine.     Marlyn Galarza, OT  11/26/2019   Summarization of Episode Note

                             Created on: 02/10/2021



CATINA MORIN

External Reference #: [id number]

: 1980

Sex: Female



Demographics





                          Address                   [address] [place]  [address]

 

                          Home Phone                [phone]

 

                          Preferred Language        Unknown

 

                          Marital Status            Unknown

 

                          Quaker Affiliation     Unknown

 

                          Race                      White

 

                          Ethnic Group              Not  or 





Author





                          Author                    [facility]

ems

 

                          Organization              [facility]

ems

 

                          Address                   Unknown

 

                          Phone                     Unavailable







Support





                Name            Relationship    Address         Phone

 

                    CATINA MORIN                [address] [place]  [address]                       [phone]

 

                    Yousuf[employer]oks ace          SILVIO                [address] [place]  [address]                       [phone]







Care Team Providers





                    Care Team Member Name Role                Phone

 

                    Karime Farrar        Unavailable         [phone]







PROBLEMS





          Type      Condition ICD9-CM Code FHP72-XG Code Onset Dates Condition S

tatus W/U 

Status              Risk                SNOMED Code         Notes

 

       Problem Urgency of urination 788.63               Active confirmed       

 [id number]  

 

       Problem Bilateral kidney stones 592.0                Active confirmed    

    51506931  

 

       Problem History of pulmonary embolism        Z86.711        Active confir

med        [id number]  

 

       Problem Factor V Leiden        D68.51        Active confirmed        3070

64109  

 

       Problem Left ureteral calculus 592.1                Active confirmed     

   [id number]  

 

       Problem Rash on lips        K13.0         Active confirmed        6608799

  

 

          Problem   MSSA (methicillin susceptible Staphylococcus aureus)        

   A49.01              Active    

confirmed                               [id number]            

 

       Problem Perioral dermatitis        L71.0         Active confirmed        

[id number]  







ALLERGIES





                    Allergen (clinical drug ingredient) Drug/Non Drug Allergy do

cumented on EMR 

Reaction            Allergy Type        Onset Date          Status

 

                      Morphine   Felt Faint, sweaty Non Drug Allergy            

Active







ENCOUNTERS from 1980 to 2021-02-10





             Encounter    Location     Date         Provider     Diagnosis

 

                          [facility] [address] [place] [address] 

[id number]                  Karime Farrar           







IMMUNIZATIONS





                Vaccine         Route           Administration Date Status

 

                Influenza (6mo & up) Fluzone Unknown         2015   Ref

used

 

                Influenza (6mo & up) Fluzone Unknown         2015  Ref

used







SOCIAL HISTORY

Tobacco Use:



                    Social History Observation Description         Date

 

                    Details (start date - stop date) Never Smoker         



Sex Assigned At Birth:



                          Social History Observation Description

 

                          Sex Assigned At Birth     Unknown



Sexual Hx:



                    Question            Answer              Notes

 

                    Had sex in the last 12 months (vaginal, oral, or anal)? Yes 

                 

 

                    Have you ever had an STD? No                   

 

                    Prevention Strategies discussed: Other                

 

                    with                Men only             

 

                    Use protection?     No                   



Tobacco Use:



                    Question            Answer              Notes

 

                    Are you a:          never smoker         







REASON FOR REFERRAL

No Information



VITAL SIGNS

No information



MEDICATIONS





           Medication SIG (Take, Route, Frequency, Duration) Notes      Start Da

te End Date   

Status

 

           Keflex 750 MG as directed Orally                                  Act

hortensia

 

           Aspirin 81 MG 1 tablet Orally Once a day                             

     Not-Taking







PROCEDURES

No Information



RESULTS

No Results



REASON FOR VISIT

No Information



MEDICAL (GENERAL) HISTORY





                    Type                Description         Date

 

                    Medical History     Factor 5 Leiden mutation, heterozygous  

 

                    Medical History     pulmonary embolism 2010  

 

                    Medical History     left nephrolithiasis  

 

                    Medical History     hospital-acquired pneumonia Pseudomonas 

2010  

 

                    Surgical History    D&C                 

 

                    Surgical History    Kidney stone        6/11/15

 

                    Surgical History    [id number]-   11/3/16

 

                    Surgical History    Right breast cyst, benign  (fatty cy

st) 

 

                    Hospitalization History PE                   

 

                    Hospitalization History Pneumonia            







Goals Section

No Information



Health Concerns

No Information



MEDICAL EQUIPMENT

No Information



MENTAL STATUS

No Information



FUNCTIONAL STATUS

No Information



ASSESSMENTS

No Information



PLAN OF TREATMENT

Next Appt



                                        Details

 

                                        Provider Name:Rodney Rodriguez, 2021

 08:00:00 AM, [address], 

[place], [address], [phone]







Insurance Providers





             Payer Name   Payer Address Payer Phone  Insured Name Patient Relati

onship to 

Insured                   Coverage Start Date       Coverage End Date

 

                [address]   [facility] [address] [phone]    CATINA MORIN              self

## 2021-02-16 NOTE — CCD
Summarization Of Episode

                             Created on: 2021



CATINA MORIN

External Reference #: [id number]

: 1980

Sex: Female



Demographics





                          Address                   [address] [place] [address]

 

                          Home Phone                [phone]

 

                          Preferred Language        English

 

                          Marital Status            

 

                          Druze Affiliation     NONE

 

                          Race                      White

 

                          Ethnic Group              Not  or 





Author





                          Author                    [facility]

 

                          Organization              [facility]

 

                          Address                   Unknown

 

                          Phone                     Unavailable







Support





                Name            Relationship    Address         Phone

 

                MANNIE MORIN    Next Of Kin     Unknown         Unavailable

 

                    [employer] Next Of Kin         [address] [place]  [address]                    [phone]

 

                    BROOKS MORIN         Next Of Kin         [address] [place] [address]                       [phone]

 

                    [employer]  [place]  [address]                    [phone]

 

                    DONNA MORIN     Next Of Kin         [address] [place], [place] [address]                       [phone]

 

                    Brooks Morin         [place] [id number]                       [phone]







Care Team Providers





                    Care Team Member Name Role                Phone

 

                    Claudia,  Sierra FNP Unavailable         Unavailable

 

                    Claudia,  Sierra FNP Unavailable         Unavailable

 

                    Claudia,  Sierra FNP Unavailable         Unavailable

 

                    Claudia,  Sierra FNP Unavailable         Unavailable

 

                    Claudia,  Sierra FNP Unavailable         Unavailable

 

                    Claudia,  Sierra FNP Unavailable         Unavailable

 

                    Claudia,  Sierra FNP Unavailable         Unavailable

 

                    Claudia,  Sierra FNP Unavailable         Unavailable

 

                    Claudia,  Sierra FNP Unavailable         Unavailable

 

                    Claudia,  Sierra FNP Unavailable         Unavailable

 

                    Claudia,  Sierra FNP Unavailable         Unavailable

 

                    Claudia,  Sierra FNP Unavailable         Unavailable

 

                    Claudia,  Sierra FNP Unavailable         Unavailable

 

                    Claudia,  Seirra FNP Unavailable         Unavailable

 

                    Claudia,  Sierra FNP Unavailable         Unavailable

 

                    Claudia,  Sierra FNP Unavailable         Unavailable

 

                    Claudia,  Sierra FNP Unavailable         Unavailable

 

                    Claudia,  Sierra FNP Unavailable         Unavailable

 

                    Claudia,  Sierra FNP Unavailable         Unavailable

 

                    Claudia,  Sierra FNP Unavailable         Unavailable

 

                    Claudia,  Sierra FNP Unavailable         Unavailable

 

                    Claudia,  Sierra FNP Unavailable         Unavailable

 

                    Claudia,  Sierra FNP Unavailable         Unavailable

 

                    Claudia,  Sierra FNP Unavailable         Unavailable

 

                    Claudia,  Sierra FNP Unavailable         Unavailable

 

                    Claudia,  Sierra FNP Unavailable         Unavailable

 

                    Claudia,  Sierra FNP Unavailable         Unavailable

 

                    PIETER, J Kaley ANP Unavailable         Unavailable

 

                    PIETER, J Kaley ANP Unavailable         Unavailable

 

                    PIETER, J Kaley ANP Unavailable         Unavailable

 

                    PIETER, J Kaley ANP Unavailable         Unavailable

 

                    PIETER, J Kaley ANP Unavailable         Unavailable

 

                    PIETER, J Kaley ANP Unavailable         Unavailable

 

                    PIETER, J Kaley ANP Unavailable         Unavailable

 

                    PIETER, J Kaley ANP Unavailable         Unavailable

 

                    PIETER, J Kaley ANP Unavailable         Unavailable

 

                    PIETER, J Kaley ANP Unavailable         Unavailable

 

                    PIETER, J Kaley ANP Unavailable         Unavailable

 

                    PIETER, J Kaley ANP Unavailable         Unavailable

 

                    PIETER, J Kaley ANP Unavailable         Unavailable

 

                    PIETER, J Kaley ANP Unavailable         Unavailable

 

                    PIETER, J Kaley ANP Unavailable         Unavailable

 

                    PIETER, J Kaley ANP Unavailable         Unavailable

 

                    PIETER, J Kaley ANP Unavailable         Unavailable

 

                    PIETER, J Kaley ANP Unavailable         Unavailable

 

                    PIETER, J Kaley ANP Unavailable         Unavailable

 

                    PIETER, J Kaley ANP Unavailable         Unavailable

 

                    PIETER, J Kaley ANP Unavailable         Unavailable

 

                    PIETER, J Kaley ANP Unavailable         Unavailable

 

                    PIETER, J Kaley ANP Unavailable         Unavailable

 

                    PIETER, J Kaley ANP Unavailable         Unavailable

 

                    PIETER, J Kaley ANP Unavailable         Unavailable

 

                    PIETER, J Kaley ANP Unavailable         Unavailable

 

                    PIETER, J Kaley ANP Unavailable         Unavailable

 

                    PIETER, J Kaley ANP Unavailable         Unavailable

 

                    PIETER, J Kaley ANP Unavailable         Unavailable

 

                    PIETER, J Kaley ANP Unavailable         Unavailable

 

                    PIETER, J Kaley ANP Unavailable         Unavailable

 

                    PIETER, J Kaley ANP Unavailable         Unavailable

 

                    PIETER, J Kaley ANP Unavailable         Unavailable

 

                    PIETER, J Kaley ANP Unavailable         Unavailable

 

                    PIETER, J Kaley ANP Unavailable         Unavailable

 

                    PIETER, J Kaley ANP Unavailable         Unavailable

 

                    PIETER, J Kaley ANP Unavailable         Unavailable

 

                    PIETER, J Kaely ANP Unavailable         Unavailable

 

                    PIETER, J Kaley ANP Unavailable         Unavailable

 

                    PIETER, J Kaley ANP Unavailable         Unavailable

 

                    PIETER, J Kaley ANP Unavailable         Unavailable

 

                    PIETER, J Kaley ANP Unavailable         Unavailable

 

                    PIETER, J Kaley ANP Unavailable         Unavailable

 

                    PIETER, J Kaley ANP Unavailable         Unavailable

 

                    PIETER, J Kaley ANP Unavailable         Unavailable

 

                    PIETER, J Kaley ANP Unavailable         Unavailable

 

                    PIETER, J Kaley ANP Unavailable         Unavailable

 

                    PIETER, J Kaley ANP Unavailable         Unavailable

 

                    PIETER, J Kaley ANP Unavailable         Unavailable

 

                    PIETER, J Kaley ANP Unavailable         Unavailable

 

                    PIETER, J Kaley ANP Unavailable         Unavailable

 

                    PIETER, J Kaley ANP Unavailable         Unavailable

 

                    PIETER, J Kaley ANP Unavailable         Unavailable

 

                    PIETER, J Kaley ANP Unavailable         Unavailable

 

                    PIETER, J Kaley ANP Unavailable         Unavailable

 

                    PIETER, J Kaley ANP Unavailable         Unavailable

 

                    PIETER, J Kaley ANP Unavailable         Unavailable

 

                    PIETER, J Kaley ANP Unavailable         Unavailable

 

                    PIETER, J Kaley ANP Unavailable         Unavailable

 

                    PIETER, J Kaley ANP Unavailable         Unavailable

 

                    PIETER, J Kaley ANP Unavailable         Unavailable

 

                    PIETER, J Kaley ANP Unavailable         Unavailable

 

                    PIETER, J Kaley ANP Unavailable         Unavailable

 

                    PIETER, J Kaley ANP Unavailable         Unavailable

 

                    PIETER, J Kaley ANP Unavailable         Unavailable

 

                    PIETER, J Kaley ANP Unavailable         Unavailable



                                  



Re-disclosure Warning

          The records that you are about to access may contain information from 
federally-assisted alcohol or drug abuse programs. If such information is 
present, then the following federally mandated warning applies: This information
has been disclosed to you from records protected by federal confidentiality 
rules (42 CFR part 2). The federal rules prohibit you from making any further 
disclosure of this information unless further disclosure is expressly permitted 
by the written consent of the person to whom it pertains or as otherwise 
permitted by 42 CFR part 2. A general authorization for the release of medical 
or other information is NOT sufficient for this purpose. The Federal rules 
restrict any use of the information to criminally investigate or prosecute any 
alcohol or drug abuse patient.The records that you are about to access may 
contain highly sensitive health information, the redisclosure of which is 
protected by Article 27-F of the [facility] [place] Public Health law. If you 
continue you may have access to information: Regarding HIV / AIDS; Provided by 
facilities licensed or operated by the [facility] [place] Office of Mental Health; 
or Provided by the [facility] [place] Office for People With Developmental 
Disabilities. If such information is present, then the following [place] 
mandated warning applies: This information has been disclosed to you from 
confidential records which are protected by state law. State law prohibits you 
from making any further disclosure of this information without the specific 
written consent of the person to whom it pertains, or as otherwise permitted by 
law. Any unauthorized further disclosure in violation of state law may result in
a fine or group home sentence or both. A general authorization for the release of 
medical or other information is NOT sufficient authorization for further disc
losure.                                                                         
    



Allergies and Adverse Reactions

          



           Type       Description Substance  Reaction   Status     Data Source(s

)

 

                Morphine        Morphine        Morphine Sulfate 15 MG Extended 

Release Oral Tablet Felt 

Faint, sweaty             Active                    eCW1 ([facility])

 

                Morphine        Morphine        Morphine Sulfate 15 MG Extended 

Release Oral Tablet Felt 

Faint, sweaty             Active                    eCW1 ([facility])

 

                Morphine        Morphine        Morphine Sulfate 15 MG Extended 

Release Oral Tablet Felt 

Faint, sweaty             Active                    eCW1 ([facility])



                                                                                
                           



Family History

          



             Family Member Name Family Member Gender Family Member Status Date o

f Status 

Description                             Data Source(s)

 

           Unknown    Unknown    Problem                          [facility] ([facility], )

 

           Unknown    Unknown    Problem                          [facility] (Jordy Coleman MD )



                                                                                
                 



Encounters

          



           Encounter  Providers  Location   Date       Indications Data Source(s

)

 

                Unknown                         [address] [place] [id number] 2021 12:00:00 AM 

EST                                                 eCW1 ([facility])

 

                Outpatient      Attender: Sierra Deluna 07

/15/2020 08:00:00 AM 

EDT                                                 [facility] ([facility]

)

 

                [facility] [address] [address] 2020 

12:00:00 AM EST                                     eCW1 ([facility])

 

                [facility]                 [address] [place] [phone] 2020 

12:00:00 AM EST                                     eCW1 ([facility])

 

           Outpatient Referrer: Kaley CASTILLO            2020 12:13:00 

PM EST            [facility]

 

                [address] [address] 2020 

12:00:00 AM EST                                     eCW1 ([facility])

 

           Outpatient Referrer: Kaley CASTILLO            2020 08:34:00 

PM EST            [facility]

 

                [facility] [address] [address] 01/15/2020 

12:00:00 AM EST                                     eCW1 ([facility])

 

                [facility] [address] [address] 01/10/2020 

12:00:00 AM EST                                     eCW1 ([facility])

 

                [address] [address] 2020 

12:00:00 AM EST                                     eCW1 ([facility])



                                                                                
                                                                                
                



Medications

          



          Medication Brand Name Start Date Product Form Dose      Route     Admi

nistrative 

Instructions Pharmacy Instructions Status     Indications Reaction   Description

 Data 

Source(s)

 

           Cephalexin 500 MG Oral Capsule CEPHALEXIN 2021 12:00:00 AM EST 

capsule    21         

                          TAKE ONE CAPSULE BY MOUTH THREE TIMES A DAY FOR 7 DAYS

 TAKE ONE CAPSULE BY 

MOUTH THREE TIMES A DAY FOR 7 DAYS SOLD: 2021                             

           [facility]

 

          100 mg              2021 12:00:00 AM EST capsule   14           

       TAKE ONE CAPSULE BY MOUTH TWICE

A DAY FOR 7 DAYS          TAKE ONE CAPSULE BY MOUTH TWICE A DAY FOR 7 DAYS SOLD:

 

2021                                                      [facility]

 

          150 mg              2021 12:00:00 AM EST tablet    2            

       TAKE ONE TABLET BY MOUTH ONCE A 

DAY THEN REPEAT IN 48 HOURS             TAKE ONE TABLET BY MOUTH ONCE A DAY THEN

 REPEAT IN 

48 HOURS     SOLD: 2021                                        [facility]

s

 

          200 mg              2021 12:00:00 AM EST tablet    6            

       TAKE ONE TABLET BY MOUTH THREE 

TIMES A DAY FOR 2 DAYS    TAKE ONE TABLET BY MOUTH THREE TIMES A DAY FOR 2 DAYS 

SOLD: 2021                                                 [facility]

 

          500 mg              2021 12:00:00 AM EST tablet    10           

       TAKE ONE TABLET BY MOUTH EVERY 

12 HOURS FOR 5 DAYS       TAKE ONE TABLET BY MOUTH EVERY 12 HOURS FOR 5 DAYS SOL

D: 

2021                                                      [facility]

 

          150 mg              2021 12:00:00 AM EST tablet    2            

       TAKE 1 TABLET BY MOUTH NOW, THEN 

TAKE 2ND TABLET IN 48 HOURS             TAKE 1 TABLET BY MOUTH NOW, THEN TAKE 2N

D TABLET IN 

48 HOURS     SOLD: 2021                                        [facility]

s

 

          200 mg              2021 12:00:00 AM EST tablet    6            

       TAKE ONE TABLET BY MOUTH THREE 

TIMES A DAY FOR 2 DAYS    TAKE ONE TABLET BY MOUTH THREE TIMES A DAY FOR 2 DAYS 

SOLD: 2021                                                 [facility]

 

          875-125 mg           10/01/2020 12:00:00 AM EDT tablet    20          

        TAKE ONE TABLET BY MOUTH 

TWICE A DAY FOR 10 DAYS   TAKE ONE TABLET BY MOUTH TWICE A DAY FOR 10 DAYS SOLD:

 

10/01/2020                                                      [facility]

 

          150 mg              10/01/2020 12:00:00 AM EDT tablet    2            

       TAKE 1 TABLET BY MOUTH THEN TAKE 

2ND TABLET IN 48 HOURS    TAKE 1 TABLET BY MOUTH THEN TAKE 2ND TABLET IN 48 HOUR

S 

SOLD: 10/01/2020                                                 [facility]



                                                                                
                                                                   



Insurance Providers

          



             Payer name   Policy type / Coverage type Policy ID    Covered party

 ID Covered 

party's relationship to chairez Policy Chairez             Plan Information

 

          M[employer]           [id number]           SP                  80

[id number]

 

          M[employer]           [id number]           SP                  80

[id number]

 

          M[employer] O         [id number]           S                   80

[id number]

 

          [employer] Commercial [id number]                               [id number]

[id number]

 

          [employer] Commercial [id number]           Self                [id number]

[id number]

 

          Ghi       Medigap Part B [id number]           Family Dependent         

  [id number]

 

          MVP       Health Maintenance Organization (HMO) [id number]           

Self                [id number]

 

          Ghi       Medigap Part B [id number]           Family Dependent         

  [id number]

 

          MVP       Health Maintenance Organization (HMO) [id number]           

Self                [id number]

 

          Ghi       Medigap Part B [id number]           Family Dependent         

  [id number]

 

          P       Health Maintenance Organization (HMO) [id number]           

Self                [id number]

 

          Ghi       Medigap Part B                     Family Dependent         

   

 

          P       Health Maintenance Organization (HMO)                     Se

lf                 

 

          MVP       H         [id number]           Self                [id number]

[id number]

 

          P HEALTH CARE HEA       [id number]                               [id number]

[id number]

 

          MEDICARE  SAROJ       UNAVAILABLE                               UNAVAILA

BLE

 

          P HEALTH CARE           [id number]           SP                  80

[id number]

 

          P Health Care Commercial                     Self                 

 

          MVP EXCHANGE U         [id number]           Self                [id number]

[id number]

 

          [facility] Healthcare Commercial                                          

 

          P Healthcare Commercial                     Self                 

 

          [facility] PHY           [id number]           SP                  

[id number]

 

          [facility] PHY           [id number]           SP                  

[id number]

 

          SELF PAY            UNAVAILABLE           SP                  UNAVAILA

BLE

 

                              [id number]                               [id number]

[id number]



                                                                                
                                                                                
                                                                                
                                                                                
    



Problems, Conditions, and Diagnoses

          



           Code       Display Name Description Problem Type Effective Dates Data

 Source(s)

 

           D68.51     [id number]  Factor V Leiden Problem    2020 12:00:00 

AM EST eCW1 

([facility])

 

             Z86.711      [id number]    History of pulmonary embolism Problem    

  2020 12:00:00 AM 

EST                                     eCW1 ([facility])

 

           D68.51     [id number]  Factor V Leiden Problem    2020 12:00:00 

AM EST eCW1 

([facility])

 

             Z86.711      [id number]    History of pulmonary embolism Problem    

  2020 12:00:00 AM 

EST                                     eCW1 ([facility])



                                                                                
                                               



Surgeries/Procedures

          



             Procedure    Description  Date         Indications  Data Source(s)

 

             NO CHARGE VISIT              2020 12:00:00 AM EST            

  eCW1 ([facility])



                                                                                
       



Results

          



                    ID                  Date                Data Source

 

                    [id number]          07/15/2020 08:14:00 AM EDT [facility] ([facility])









          Name      Value     Range     Interpretation Code Description Data Beth

rce(s) Supporting 

Document(s)

 

           Cholesterol [Mass/volume] in Serum or Plasma 158 mg/dL  131-200      

                    [facility] 

([facility])                   

 

           Triglyceride [Mass/volume] in Serum or Plasma 39 mg/dL         

                     [facility] 

([facility])                   

 

           Cholesterol in HDL [Mass/volume] in Serum or Plasma 55 mg/dL   35-60 

                           [facility] 

([facility])                   

 

                    Cholesterol in LDL [Mass/volume] in Serum or Plasma by calcu

lation 95 CALC             

                                          [facility] ([facility])  









                    ID                  Date                Data Source

 

                    [id number]          07/15/2020 08:14:00 AM EDT [facility] ([facility])









          Name      Value     Range     Interpretation Code Description Data Beth

rce(s) Supporting 

Document(s)

 

           Glucose [Mass/volume] in Serum or Plasma 79 mg/dL   74-99            

                [facility] ([facility])                              

 

                                        100-125 mg/dL     PRE-DIABETES/FASTING

>126 mg/dL          DIABETES/FASTING

 

 

           Urea nitrogen [Mass/volume] in Serum or Plasma 15 mg/dL   7-18       

                      [facility] 

([facility])                   

 

           Sodium [Moles/volume] in Serum or Plasma 140 meq/L  136-145          

                [facility] ([facility])                             

 

          Creatinine 0.9 mg/dL 0.6-1.3                       [facility]  

 

           Chloride [Moles/volume] in Serum or Plasma 105 meq/L           

                  [facility] 

([facility])                   

 

           Potassium [Moles/volume] in Serum or Plasma 4.2 meq/L  3.5-5.1       

                   [facility] 

([facility])                   

 

           Carbon dioxide, total [Moles/volume] in Serum or Plasma 28 meq/L   21

-32                            

[facility] ([facility])            

 

                    Alkaline phosphatase isoenzyme [Units/volume] in Serum or Pl

asma 38 mg/dL            

                                                [facility] ([facility])  

 

           Calcium [Mass/volume] in Serum or Plasma 8.3 mg/dL  8.5-10.1         

                [facility] 

([facility])                   

 

                                        NOTE:

RESULT VERIFIED.





 

 

          Total Bilirubin 1.9 mg/dL 0.2-1.0                       [facility] ([facility])  

 

                          Aspartate aminotransferase [Enzymatic activity/volume]

 in Serum or Plasma 13 U/L

             15-37                                  [facility] ([facility]

)  

 

                    Alanine aminotransferase [Enzymatic activity/volume] in Seru

m or Plasma 24 U/L              

12-78                                           [facility] ([facility])  

 

           Albumin [Mass/volume] in Serum or Plasma 3.9 g/dL   3.4-5.0          

                [facility] ([facility]                              

 

          A/G Ratio 1.39 CALC 1.00-1.90                     [facility]  

 

           Proteinase 3 Ab [Units/volume] in Serum 6.7 g/dL   6.4-8.2           

               [facility] ([facility])                              

 

                                        Glomerular filtration rate/1.73 sq M pre

dicted among non-blacks [Volume 

Rate/Area] in Serum or Plasma by Creatinine-based formula (MDRD) Laboratory test

result                                              [facility]

)  

 

                                        Glomerular filtration rate/1.73 sq M pre

dicted among blacks [Volume Rate/Area] 

in Serum or Plasma by Creatinine-based formula (MDRD) Laboratory test result    

                  

                                        [facility]  

 

                                        <content>CHRONIC KIDNEY DISEASE STAGING 

PER 

NKF</content><br/><content></content><br/><content>STAGE I & II      GFR >= 60  
     NORMAL TO MILDLY DECREASED</content><br/><content>STAGE III          GFR 
30-59          MODERATELY DECREASED</content><br/><content>STAGE IV           
GFR 15-29         SEVERELY DECREASED</content><br/><content>STAGE V            
GFR <15            VERY LITTLE GFR LEFT</content><br/><content>ESRD             
   GFR <15            ON RRT</content><br/><content></content> 









                    ID                  Date                Data Source

 

                    [id number]          07/15/2020 08:14:00 AM EDT [facility] ([facility])









          Name      Value     Range     Interpretation Code Description Data Beth

rce(s) Supporting 

Document(s)

 

           Erythrocytes [#/volume] in Blood by Automated count 4.52 x10*6/UL 4.2

0-6.30                        

[facility] ([facility])            

 

           Leukocytes [#/volume] in Blood by Automated count 3.5 x10*3/UL 4.1-10

.9                         

[facility] ([facility])            

 

           Hematocrit [Volume Fraction] of Blood by Automated count 39.8 %     3

7.0-51.0                        

[facility] ([facility])            

 

           Hemoglobin [Mass/volume] in Blood 13.9 g/dL  12.0-18.0               

         [facility] ([facility]                              

 

          MCV       87.9 fL   80.0-97.0                     [facility]  

 

          MCH       30.7 pg   26.0-32.0                     [facility]  

 

          MCHC      34.9 g/dL 31.0-38.0                     [facility]  

 

           Erythrocyte distribution width [Ratio] by Automated count 12.4 %     

11.6-13.7                        

[facility] ([facility])            

 

           Platelets [#/volume] in Blood by Automated count 219 x10*3/-440

                          [facility]

 ([facility])                  

 

          MPV       8.9 FL    7.8-11.0                      [facility]  

 

          Lymph %   42.6 %    10.0-58.5                     [facility]  

 

          Mid %     8.4 %     1.7-9.3                       [facility]  

 

          Neut %    49.0 %    37.0-92.0                     [facility]  

 

          Lymph #   1.5 x10*3/UL 0.6-4.1                       [facility] ([facility])  

 

          Mid #     0.3 x10*3/UL 0.1-0.6                       [facility] ([facility])  

 

          Neut #    1.7 x10*3/UL 2.0-7.8                       [facility] ([facility])  







                                        Procedure

 

                                          



                                                                                
                                      



Social History

          



           Code       Duration   Value      Status     Description Data Source(s

)

 

           Smoking    2021 12:00:00 AM EST Never Smoker completed  Never S

moker eCW1 

([facility])



                                                                                
                  



Vital Signs

          



                    ID                  Date                Data Source

 

                    UNK                                      









           Name       Value      Range      Interpretation Code Description Data

 Source(s)

 

           Body mass index (BMI) [Ratio] 23.4 kg/m2                       23.4 k

g/m2 [facility] ([facility])

 

           Oxygen saturation in Arterial blood by Pulse oximetry 99 %           

                  99 %       [facility] 

([facility])

 

           Body weight 146.00 [lb_av]                       146.00 [lb_av] [facility]

T ([facility])

 

           Body height 66.25 [in_i]                       66.25 [in_i[facility]

 

                                        5'6.25" 

 

           Heart rate 78 /min                          78 /min    [facility] ([facility])

 

           Diastolic blood pressure 74 mm[Hg]                        74 mm[Hg]  

[facility] ([facility])

 

           Systolic blood pressure 132 mm[Hg]                       132 mm[Hg] M

[facility] ([facility])

 

           Diastolic blood pressure 66 mm[Hg]                        66 mm[Hg]  

eCW1 ([facility])

 

           Systolic blood pressure 100 mm[Hg]                       100 mm[Hg] e

CW1 ([facility])

 

           Body mass index (BMI) [Ratio] 23.63 kg/m2                       23.63

 kg/m2 [facility] ([facility])

 

           Body height 66 [in_us]                       66 [in_us] W1 ([facility])

 

           Body weight Measured 146.4 [lb_av]                       146.4 [lb_av

] W1 ([facility])

 

           Diastolic blood pressure 78 mm[Hg]                        78 mm[Hg]  

eCW1 ([facility])

 

           Systolic blood pressure 130 mm[Hg]                       130 mm[Hg] e

CW1 ([facility])

 

           Body temperature 97.8 [degF]                       97.8 [degF] [facility] (

[facility])

 

           Respiratory rate 16 /min                          16 /min    eCW1 ([facility])

 

           Heart rate 68 /min                          68 /min    eCW1 ([facility])

 

           Body mass index (BMI) [Ratio] 23.72 kg/m2                       23.72

 kg/m2 eCW1 ([facility])

 

           Body height                                   [in_us]   eCW1 ([facility])

 

           Body weight Measured 147 [lb_av]                       147 [lb_av] eC

W1 ([facility])

 

           Diastolic blood pressure 70 mm[Hg]                        70 mm[Hg]  

eCW1 ([facility])

 

           Systolic blood pressure 122 mm[Hg]                       122 mm[Hg] e

CW1 ([facility])

 

           Body mass index (BMI) [Ratio] 23.56 kg/m2                       23.56

 kg/m2 eCW1 ([facility])

 

           Body height                                   [in_us]   eCW1 ([facility])

 

           Body weight Measured 146 [lb_av]                       146 [lb_av] eC

W1 ([facility])

## 2021-02-23 ENCOUNTER — HOSPITAL ENCOUNTER (OUTPATIENT)
Dept: HOSPITAL 53 - M WHC | Age: 41
End: 2021-02-23
Attending: ADVANCED PRACTICE MIDWIFE
Payer: COMMERCIAL

## 2021-02-23 DIAGNOSIS — Z97.5: ICD-10-CM

## 2021-02-23 DIAGNOSIS — R10.2: Primary | ICD-10-CM

## 2021-02-23 NOTE — REP
INDICATION:

R10.2 PELVIC PAIN.



COMPARISON:

None.



TECHNIQUE:

Transabdominal and transvaginal scanning performed.



FINDINGS:

Uterine dimensions are  9.3 x 5.9 x 7.1 cm.  Endometrial echo is 8-9 mm in AP

dimension and centrally placed. IUD is seen within the endometrial canal.  A suspected

hypoechoic fibroid is seen in the anterior uterus measuring 1.0 x 1.2 x 0.6 cm.

The bladder measures approximately 7.8 x 3.7 cm.  The uterus is retroverted..



The right ovary has dimensions of 3.0 x 2.2 x 2.3 cm.  It's Doppler flow is normal

with a resistive index of 0.44.



The left ovary dimensions are 2.9 x 1.6 x 1.5 cm.  It's Doppler flow was normal with

resistive index of 0.50.



Complex dominant follicle of the right ovary measures 1.9 x 1.5 x 1.5 cm.



There is mild free fluid in the pelvis.





IMPRESSION:

Small anterior fibroid.  IUD within the endometrial canal.  Mild free fluid.  No

adnexal mass.





<Electronically signed by Desmond Salazar > 02/23/21 [id number]

## 2021-03-01 ENCOUNTER — HOSPITAL ENCOUNTER (OUTPATIENT)
Dept: HOSPITAL 53 - M WHC | Age: 41
End: 2021-03-01
Attending: ADVANCED PRACTICE MIDWIFE
Payer: COMMERCIAL

## 2021-03-01 DIAGNOSIS — Z80.49: ICD-10-CM

## 2021-03-01 DIAGNOSIS — Z12.31: Primary | ICD-10-CM

## 2021-03-01 NOTE — REPMRS
Patient History

Family history of endometrial cancer at age 58 in mother, unknown

cancer at age 60 in father.

Benign US guided breast biopsy of the left breast, January 24, 2020.

Right breast biopsy in 2009.

 

3D TOMOSYNTHESIS WAS PERFORMED.

 

The Venu Avendaño lifetime risk for breast cancer is 12.5%.

 

Volpara breast density c.

 

Digital Woman Screen Mammo: March 1, 2021 - Exam #: 

[id number]

Bilateral CC and MLO view(s) were taken.

 

Technologist: RT Stefan

Prior study comparison: January 24, 2020, left breast digital 

mammo diagnostic unilateral, performed at [facility].  January 8, 2020, digital mammo diagnostic bilateral, 

performed at [facility].

 

FINDINGS: The breast tissue is heterogeneously dense.  This may 

lower the sensitivity of mammography.  There has been no change 

in the appearance of the mammogram from the prior studies.  There

is a moderate amount of residual fibroglandular tissue which is 

fairly symmetric. There is no interval development of dominant 

mass, areas of architectural distortion, or clustered 

microcalcification typical of malignancy.

 

Assessment: BI-RADS/ACR category 1 mammogram. Negative Mammogram.

 

Recommendation

Routine screening mammogram in 1 year (for women over age 40).

This mammogram was interpreted with the aid of an FDA-approved 

computer-aided dectection system.

 

Electronically Signed By: Desmond Salazar MD 03/01/21 [id number]

## 2021-04-13 ENCOUNTER — HOSPITAL ENCOUNTER (OUTPATIENT)
Dept: HOSPITAL 53 - M PLAIMG | Age: 41
End: 2021-04-13
Attending: SURGERY
Payer: COMMERCIAL

## 2021-04-13 DIAGNOSIS — N63.20: Primary | ICD-10-CM

## 2021-04-13 NOTE — REP
INDICATION:

N63.20 LEFT BREAST MASS.



COMPARISON:

01/08/2020.



TECHNIQUE:

Real-time sonographic evaluation of left breast performed.



FINDINGS:

At the 2 to 3 o'clock position the previously biopsied solid nodules are again

identified each containing a biopsy clip.  These measure 1.4 x 0.9 x 1.5 cm and 1.5 x

0.7 x 1.3 cm, unchanged since the prior exam.  At the 1 to 2 o'clock position there is

a hypoechoic nodule again identified measuring 11 x 4 x 5 mm, previously 7 x 3 x 5 mm.





IMPRESSION:

BIRADS/ACR category 3, probably benign.  Three hypoechoic solid nodule again

identified between 1 and 3 o'clock as discussed in detail above.  Two have been

biopsied and reportedly are fibroadenomas, and are stable in size.  The other nodule

located at 1 to 2 o'clock has minimally increased in size and also likely represents a

fibroadenoma.  Recommend follow-up ultrasound in 6 months..



RECOMMENDATION:

Recommend follow-up left breast ultrasound in 6 months.





<Electronically signed by Desmond Salazar > 04/13/21 [id number]

## 2021-10-04 ENCOUNTER — HOSPITAL ENCOUNTER (OUTPATIENT)
Dept: HOSPITAL 53 - M WHC | Age: 41
End: 2021-10-04
Attending: SURGERY
Payer: COMMERCIAL

## 2021-10-04 DIAGNOSIS — N63.20: Primary | ICD-10-CM

## 2021-10-04 NOTE — REP
INDICATION:

LEFT BREAST FIBROADENOMA X4. Follow-up of previously biopsied fibroadenomas.  Patient

is status post ultrasound-guided needle biopsy 24 January 2020.



COMPARISON:

Comparison left breast sonography April 13, 2021.  8 January 2020 sonography is also

reviewed.



TECHNIQUE:

Targeted left breast sonography is performed.



FINDINGS:

Heterogeneous fibroglandular background echotexture is seen.  There is a 7 mm cyst at

1 o'clock in the left breast on today's sonography.  This has a benign appearance.



At 1 o'clock in the left breast there is a 6 x 12 x 3 mm hypoechoic area which is felt

to be essentially unchanged.



In the 2 o'clock position of the left breast there are 2 hypoechoic nodules each of

which contains a echogenic marker clip as previously noted.  These measure 13 x 7 x 13

mm and 15 x 14 x 9 mm.  They are unchanged from the prior study.



IMPRESSION:

Stable BI-RADS category 3 probably benign findings.  Consider additional follow-up

sonography 6 months hence.





<Electronically signed by Tanvir Lipscomb > 10/04/21 [id number]

## 2021-12-21 ENCOUNTER — HOSPITAL ENCOUNTER (OUTPATIENT)
Dept: HOSPITAL 53 - M RAD | Age: 41
End: 2021-12-21
Attending: PHYSICIAN ASSISTANT
Payer: COMMERCIAL

## 2021-12-21 DIAGNOSIS — R22.1: Primary | ICD-10-CM

## 2021-12-21 PROCEDURE — 70491 CT SOFT TISSUE NECK W/DYE: CPT

## 2021-12-21 NOTE — REPVR
PROCEDURE INFORMATION: 

Exam: CT Neck With Contrast 

Exam date and time: 12/21/2021 10:43 AM 

Age: 41 years old 

Clinical indication: Other: Swelling of nasopharnyx; Additional info: Localized 

swelling, mass lump of neck 



TECHNIQUE: 

Imaging protocol: Computed tomography images of the neck with contrast. 

Radiation optimization: All CT scans at this facility use at least one of these 

dose optimization techniques: automated exposure control; mA and/or kV 

adjustment per patient size (includes targeted exams where dose is matched to 

clinical indication); or iterative reconstruction. 

Contrast material: ISOVUE 370; Contrast volume: 75 ml; Contrast route: 

INTRAVENOUS (IV);  



COMPARISON: 

CT Head without contrast 10/24/2015 12:24 PM 



FINDINGS: 

Nasopharynx: Unremarkable. 

Oropharynx: Unremarkable. No significant tonsillar enlargement. 

Hypopharynx: Unremarkable. 

Larynx: Unremarkable. Normal epiglottis. 

Retropharyngeal space: Unremarkable. 

Submandibular/Parotid glands: Normal. Glands are normal in size. 

Thyroid: Normal. No enlarged or calcified nodules.  

Lymph nodes: Unremarkable. No lymphadenopathy. 



Trachea: Visualized trachea is unremarkable. 

Lungs: Unremarkable as visualized. 

Bones/joints: Unremarkable. No acute fracture. 

Soft tissues: Unremarkable. No significant soft tissue swelling. 



IMPRESSION: 

No acute findings. 



Electronically signed by: Alba Kim On 12/21/2021  11:31:25 AM

## 2022-04-01 ENCOUNTER — HOSPITAL ENCOUNTER (OUTPATIENT)
Dept: HOSPITAL 53 - M WHC | Age: 42
End: 2022-04-01
Attending: NURSE PRACTITIONER
Payer: COMMERCIAL

## 2022-04-01 DIAGNOSIS — N63.20: ICD-10-CM

## 2022-04-01 DIAGNOSIS — Z12.31: Primary | ICD-10-CM

## 2022-10-18 ENCOUNTER — HOSPITAL ENCOUNTER (OUTPATIENT)
Dept: HOSPITAL 53 - M WHC | Age: 42
End: 2022-10-18
Attending: NURSE PRACTITIONER
Payer: COMMERCIAL

## 2022-10-18 DIAGNOSIS — D24.2: Primary | ICD-10-CM

## 2022-10-18 DIAGNOSIS — R92.8: ICD-10-CM

## 2023-07-13 ENCOUNTER — HOSPITAL ENCOUNTER (OUTPATIENT)
Dept: HOSPITAL 53 - M SFHCWAGY | Age: 43
End: 2023-07-13
Attending: NURSE PRACTITIONER
Payer: COMMERCIAL

## 2023-07-13 ENCOUNTER — HOSPITAL ENCOUNTER (OUTPATIENT)
Dept: HOSPITAL 53 - M WHC | Age: 43
End: 2023-07-13
Attending: NURSE PRACTITIONER
Payer: COMMERCIAL

## 2023-07-13 DIAGNOSIS — Z12.4: Primary | ICD-10-CM

## 2023-07-13 DIAGNOSIS — Z12.31: Primary | ICD-10-CM

## 2023-07-13 PROCEDURE — 87624 HPV HI-RISK TYP POOLED RSLT: CPT

## 2024-03-28 ENCOUNTER — HOSPITAL ENCOUNTER (OUTPATIENT)
Dept: HOSPITAL 53 - M SOG | Age: 44
End: 2024-03-28
Attending: ORTHOPAEDIC SURGERY
Payer: COMMERCIAL

## 2024-03-28 DIAGNOSIS — M54.50: Primary | ICD-10-CM

## 2024-08-31 ENCOUNTER — HOSPITAL ENCOUNTER (EMERGENCY)
Dept: HOSPITAL 53 - M ED | Age: 44
Discharge: HOME | End: 2024-08-31
Payer: COMMERCIAL

## 2024-08-31 VITALS — BODY MASS INDEX: 29.23 KG/M2 | WEIGHT: 181.88 LBS | HEIGHT: 66 IN

## 2024-08-31 VITALS — TEMPERATURE: 97.6 F | OXYGEN SATURATION: 98 % | SYSTOLIC BLOOD PRESSURE: 107 MMHG | DIASTOLIC BLOOD PRESSURE: 74 MMHG

## 2024-08-31 DIAGNOSIS — Z88.5: ICD-10-CM

## 2024-08-31 DIAGNOSIS — M54.6: Primary | ICD-10-CM

## 2024-08-31 DIAGNOSIS — Z87.442: ICD-10-CM

## 2024-08-31 DIAGNOSIS — Z79.82: ICD-10-CM

## 2024-08-31 DIAGNOSIS — Z79.899: ICD-10-CM

## 2024-08-31 DIAGNOSIS — R00.1: ICD-10-CM

## 2024-08-31 DIAGNOSIS — F32.A: ICD-10-CM

## 2024-08-31 DIAGNOSIS — Z86.711: ICD-10-CM

## 2024-08-31 LAB
B-HCG SERPL QL: NEGATIVE
BASOPHILS # BLD AUTO: 0 10^3/UL (ref 0–0.2)
BASOPHILS NFR BLD AUTO: 0.7 % (ref 0–1)
CK MB CFR.DF SERPL CALC: 2.43
CK MB SERPL-MCNC: < 1 NG/ML (ref ?–3.6)
CK SERPL-CCNC: 41 U/L (ref 34–145)
EOSINOPHIL # BLD AUTO: 0.1 10^3/UL (ref 0–0.5)
EOSINOPHIL NFR BLD AUTO: 1.3 % (ref 0–3)
HCT VFR BLD AUTO: 44.4 % (ref 36–47)
HGB BLD-MCNC: 15.4 G/DL (ref 12–15.5)
INR PPP: 1.03
LYMPHOCYTES # BLD AUTO: 1.7 10^3/UL (ref 1.5–5)
LYMPHOCYTES NFR BLD AUTO: 36.1 % (ref 24–44)
MCH RBC QN AUTO: 32 PG (ref 27–33)
MCHC RBC AUTO-ENTMCNC: 34.7 G/DL (ref 32–36.5)
MCV RBC AUTO: 92.1 FL (ref 80–96)
MONOCYTES # BLD AUTO: 0.4 10^3/UL (ref 0–0.8)
MONOCYTES NFR BLD AUTO: 8.5 % (ref 2–8)
NEUTROPHILS # BLD AUTO: 2.4 10^3/UL (ref 1.5–8.5)
NEUTROPHILS NFR BLD AUTO: 53.2 % (ref 36–66)
PLATELET # BLD AUTO: 236 10^3/UL (ref 150–450)
PROTHROMBIN TIME: 13.2 SECONDS (ref 12.5–14.5)
RBC # BLD AUTO: 4.82 10^6/UL (ref 4–5.4)
WBC # BLD AUTO: 4.6 10^3/UL (ref 4–10)

## 2024-08-31 PROCEDURE — 85025 COMPLETE CBC W/AUTO DIFF WBC: CPT

## 2024-08-31 PROCEDURE — 80047 BASIC METABLC PNL IONIZED CA: CPT

## 2024-08-31 PROCEDURE — 99284 EMERGENCY DEPT VISIT MOD MDM: CPT

## 2024-08-31 PROCEDURE — 71275 CT ANGIOGRAPHY CHEST: CPT

## 2024-08-31 PROCEDURE — 82550 ASSAY OF CK (CPK): CPT

## 2024-08-31 PROCEDURE — 82553 CREATINE MB FRACTION: CPT

## 2024-08-31 PROCEDURE — 84484 ASSAY OF TROPONIN QUANT: CPT

## 2024-08-31 PROCEDURE — 85610 PROTHROMBIN TIME: CPT

## 2024-08-31 PROCEDURE — 93005 ELECTROCARDIOGRAM TRACING: CPT

## 2024-08-31 PROCEDURE — 96374 THER/PROPH/DIAG INJ IV PUSH: CPT

## 2024-08-31 PROCEDURE — 84703 CHORIONIC GONADOTROPIN ASSAY: CPT

## 2024-08-31 RX ADMIN — KETOROLAC TROMETHAMINE ONE MG: 30 INJECTION, SOLUTION INTRAMUSCULAR at 12:55

## 2024-08-31 RX ADMIN — DIAZEPAM ONE MG: 5 TABLET ORAL at 12:55

## 2024-08-31 RX ADMIN — ACETAMINOPHEN ONE MLS/HR: 10 INJECTION, SOLUTION INTRAVENOUS at 11:52

## 2024-09-23 ENCOUNTER — HOSPITAL ENCOUNTER (OUTPATIENT)
Dept: HOSPITAL 53 - M WUC | Age: 44
End: 2024-09-23
Attending: INTERNAL MEDICINE
Payer: COMMERCIAL

## 2024-09-23 DIAGNOSIS — R91.8: Primary | ICD-10-CM
